# Patient Record
Sex: FEMALE | Race: WHITE | NOT HISPANIC OR LATINO | Employment: UNEMPLOYED | URBAN - METROPOLITAN AREA
[De-identification: names, ages, dates, MRNs, and addresses within clinical notes are randomized per-mention and may not be internally consistent; named-entity substitution may affect disease eponyms.]

---

## 2020-02-04 ENCOUNTER — OFFICE VISIT (OUTPATIENT)
Dept: FAMILY MEDICINE CLINIC | Facility: CLINIC | Age: 40
End: 2020-02-04

## 2020-02-04 VITALS
BODY MASS INDEX: 21.41 KG/M2 | HEART RATE: 69 BPM | SYSTOLIC BLOOD PRESSURE: 122 MMHG | HEIGHT: 65 IN | TEMPERATURE: 99 F | DIASTOLIC BLOOD PRESSURE: 76 MMHG | OXYGEN SATURATION: 99 % | RESPIRATION RATE: 12 BRPM | WEIGHT: 128.5 LBS

## 2020-02-04 DIAGNOSIS — R68.89 COLD INTOLERANCE: ICD-10-CM

## 2020-02-04 DIAGNOSIS — L65.9 HAIR LOSS: ICD-10-CM

## 2020-02-04 DIAGNOSIS — Z13.220 SCREENING FOR HYPERLIPIDEMIA: ICD-10-CM

## 2020-02-04 DIAGNOSIS — Z00.00 ANNUAL PHYSICAL EXAM: Primary | ICD-10-CM

## 2020-02-04 PROCEDURE — 99385 PREV VISIT NEW AGE 18-39: CPT | Performed by: FAMILY MEDICINE

## 2020-02-04 NOTE — PROGRESS NOTES
"Chief Complaint   Patient presents with   • Establish Care     NP to [facility]   • Annual Exam     not current on Pap but has obpeten       Dwayne ZURITA Darline is a 39 y.o. female.     History of Present Illness   She said her hair falls out a lot. Since the last baby who is 6 yo. Said normal for after baby but then didn't ever stop.   says her hair falls out quickly. Different lengths all over and breaking easy.     Annual exam  Gyn care she does have an appointment end of the month.  Diet and exercise  She is a runner. Also lifts weights and swims, stays active.  Eats overall pretty healthy clean whole foods, she likes sweets but tries to limit.   She does not do the flu shot.   Thinks She had TDaP with 7 yo, just turned 8.    The following portions of the patient's history were reviewed and updated as appropriate: allergies, current medications, past family history, past medical history, past social history, past surgical history and problem list.    Review of Systems   Constitutional: Negative for activity change, appetite change and unexpected weight change.   Respiratory: Negative for shortness of breath.    Cardiovascular: Negative for chest pain and leg swelling.   Gastrointestinal: Negative for blood in stool, constipation and diarrhea.       /76 (BP Location: Left arm, Patient Position: Sitting, Cuff Size: Small Adult)   Pulse 69   Temp 99 °F (37.2 °C) (Oral)   Resp 12   Ht 164 cm (64.57\")   Wt 58.3 kg (128 lb 8 oz)   SpO2 99%   BMI 21.67 kg/m²       Objective   Physical Exam   Constitutional: She is oriented to person, place, and time. She appears well-nourished. No distress.   HENT:   Right Ear: External ear normal.   Left Ear: External ear normal.   Nose: Nose normal.   Mouth/Throat: Oropharynx is clear and moist. No oropharyngeal exudate.   Bilateral ear canals and tympanic membranes appear normal.   Eyes: Conjunctivae and EOM are normal. Right eye exhibits no discharge. Left " eye exhibits no discharge. No scleral icterus.   Neck: Neck supple. No thyromegaly present.   Cardiovascular: Normal rate, regular rhythm, normal heart sounds and intact distal pulses. Exam reveals no gallop and no friction rub.   No murmur heard.  Pulmonary/Chest: Effort normal and breath sounds normal. No respiratory distress. She has no wheezes. She has no rales. She exhibits no tenderness.   Abdominal: Soft. Bowel sounds are normal. She exhibits no distension and no mass. There is no tenderness. There is no guarding.   Musculoskeletal: She exhibits no edema or deformity.   Lymphadenopathy:     She has no cervical adenopathy.   Neurological: She is alert and oriented to person, place, and time. She exhibits normal muscle tone. Coordination normal.   Skin: Skin is warm and dry.   No notable scalp showing through any areas. Diffuse thinning but minimal.    Psychiatric: She has a normal mood and affect. Her behavior is normal.   Vitals reviewed.      Assessment/Plan   Belle was seen today for establish care and annual exam.    Diagnoses and all orders for this visit:    Annual physical exam  -     Comprehensive Metabolic Panel    Cold intolerance  -     CBC & Differential  -     Comprehensive Metabolic Panel    Hair loss  -     Comprehensive Metabolic Panel  -     TSH  -     T4    Screening for hyperlipidemia  -     Lipid Panel    Preventive counseling  We discussed the importance of regular physical activity.  Cardiovascular activity for the equivalent of 30 minutes 5 days per week.  We also discussed the importance of healthy diet to avoid weight gain and sugar intolerance.  I recommend predominantly filling the diet with vegetables and lean meats followed by fruits and whole grains.  I also recommend overall avoiding processed foods.  She does great with exercise and pretty good on diet.   We discussed vaccines. If had TDaP with pregnancy she will likely be due soon. She will check with gyn next time she is  there.   Follows with gyn regularly.   We will check in on baseline labs today and labs considering her complaints of hair loss.

## 2020-02-05 LAB
ALBUMIN SERPL-MCNC: 4.4 G/DL (ref 3.5–5.2)
ALBUMIN/GLOB SERPL: 2 G/DL
ALP SERPL-CCNC: 58 U/L (ref 39–117)
ALT SERPL-CCNC: 12 U/L (ref 1–33)
AST SERPL-CCNC: 17 U/L (ref 1–32)
BASOPHILS # BLD AUTO: 0.02 10*3/MM3 (ref 0–0.2)
BASOPHILS NFR BLD AUTO: 0.3 % (ref 0–1.5)
BILIRUB SERPL-MCNC: 0.6 MG/DL (ref 0.2–1.2)
BUN SERPL-MCNC: 13 MG/DL (ref 6–20)
BUN/CREAT SERPL: 15.3 (ref 7–25)
CALCIUM SERPL-MCNC: 9.3 MG/DL (ref 8.6–10.5)
CHLORIDE SERPL-SCNC: 103 MMOL/L (ref 98–107)
CHOLEST SERPL-MCNC: 138 MG/DL (ref 0–200)
CO2 SERPL-SCNC: 23.6 MMOL/L (ref 22–29)
CREAT SERPL-MCNC: 0.85 MG/DL (ref 0.57–1)
EOSINOPHIL # BLD AUTO: 0.08 10*3/MM3 (ref 0–0.4)
EOSINOPHIL NFR BLD AUTO: 1.1 % (ref 0.3–6.2)
ERYTHROCYTE [DISTWIDTH] IN BLOOD BY AUTOMATED COUNT: 13.4 % (ref 12.3–15.4)
GLOBULIN SER CALC-MCNC: 2.2 GM/DL
GLUCOSE SERPL-MCNC: 85 MG/DL (ref 65–99)
HCT VFR BLD AUTO: 38.9 % (ref 34–46.6)
HDLC SERPL-MCNC: 75 MG/DL (ref 40–60)
HGB BLD-MCNC: 12.6 G/DL (ref 12–15.9)
IMM GRANULOCYTES # BLD AUTO: 0.02 10*3/MM3 (ref 0–0.05)
IMM GRANULOCYTES NFR BLD AUTO: 0.3 % (ref 0–0.5)
LDLC SERPL CALC-MCNC: 51 MG/DL (ref 0–100)
LYMPHOCYTES # BLD AUTO: 1.98 10*3/MM3 (ref 0.7–3.1)
LYMPHOCYTES NFR BLD AUTO: 27.5 % (ref 19.6–45.3)
MCH RBC QN AUTO: 27.9 PG (ref 26.6–33)
MCHC RBC AUTO-ENTMCNC: 32.4 G/DL (ref 31.5–35.7)
MCV RBC AUTO: 86.3 FL (ref 79–97)
MONOCYTES # BLD AUTO: 0.71 10*3/MM3 (ref 0.1–0.9)
MONOCYTES NFR BLD AUTO: 9.9 % (ref 5–12)
NEUTROPHILS # BLD AUTO: 4.39 10*3/MM3 (ref 1.7–7)
NEUTROPHILS NFR BLD AUTO: 60.9 % (ref 42.7–76)
NRBC BLD AUTO-RTO: 0 /100 WBC (ref 0–0.2)
PLATELET # BLD AUTO: 264 10*3/MM3 (ref 140–450)
POTASSIUM SERPL-SCNC: 4.2 MMOL/L (ref 3.5–5.2)
PROT SERPL-MCNC: 6.6 G/DL (ref 6–8.5)
RBC # BLD AUTO: 4.51 10*6/MM3 (ref 3.77–5.28)
SODIUM SERPL-SCNC: 138 MMOL/L (ref 136–145)
T4 SERPL-MCNC: 5.65 MCG/DL (ref 4.5–11.7)
TRIGL SERPL-MCNC: 58 MG/DL (ref 0–150)
TSH SERPL DL<=0.005 MIU/L-ACNC: 2.2 UIU/ML (ref 0.27–4.2)
VLDLC SERPL CALC-MCNC: 11.6 MG/DL
WBC # BLD AUTO: 7.2 10*3/MM3 (ref 3.4–10.8)

## 2020-02-11 ENCOUNTER — TELEPHONE (OUTPATIENT)
Dept: OBSTETRICS AND GYNECOLOGY | Age: 40
End: 2020-02-11

## 2020-02-11 NOTE — TELEPHONE ENCOUNTER
Please try to call pt again, I have left 2 messages. Pt was a Dr Ibrahim Pt and needs to be scheduled with the correct Dr

## 2020-03-05 ENCOUNTER — OFFICE VISIT (OUTPATIENT)
Dept: OBSTETRICS AND GYNECOLOGY | Age: 40
End: 2020-03-05

## 2020-03-05 VITALS
HEIGHT: 65 IN | DIASTOLIC BLOOD PRESSURE: 68 MMHG | BODY MASS INDEX: 21.06 KG/M2 | WEIGHT: 126.4 LBS | SYSTOLIC BLOOD PRESSURE: 118 MMHG

## 2020-03-05 DIAGNOSIS — Z11.51 ENCOUNTER FOR SCREENING FOR HUMAN PAPILLOMAVIRUS (HPV): ICD-10-CM

## 2020-03-05 DIAGNOSIS — Z01.419 WELL WOMAN EXAM WITH ROUTINE GYNECOLOGICAL EXAM: Primary | ICD-10-CM

## 2020-03-05 DIAGNOSIS — N92.0 MENORRHAGIA WITH REGULAR CYCLE: ICD-10-CM

## 2020-03-05 PROCEDURE — 99385 PREV VISIT NEW AGE 18-39: CPT | Performed by: OBSTETRICS & GYNECOLOGY

## 2020-03-05 NOTE — PROGRESS NOTES
"Chief complaint: annual/ re-establish care    Subjective   History of Present Illness    Belle Gregorio is a 39 y.o.  who presents for annual exam/ re-establish care. C/o menorrhagia. She desires intervention with Progesterone IUD. Contraception- vasectomy.  Her menses are regular every 28-30 days, lasting 6 days, soaks through tampons and pads. Interferes with daily activities. Does have terrible HAs that last 4 days, taking magnesium for them.  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         Current contraception: vasectomy  History of abnormal Pap smear: no  Received Gardasil immunization: no  Perform regular self breast exam: yes -    Family history of uterine or ovarian cancer: no  Family History of colon cancer: no  Family history of breast cancer: yes - maternal aunt dx early 50s    Mammogram: not indicated.  Colonoscopy: not indicated.  DEXA: not indicated.    Exercise: moderately active  Calcium/Vitamin D: adequate intake    The following portions of the patient's history were reviewed and updated as appropriate: allergies, current medications, past family history, past medical history, past social history, past surgical history and problem list.    Review of Systems   Constitutional: Negative.    Respiratory: Negative.    Cardiovascular: Negative.    Gastrointestinal: Negative.    Genitourinary:        Menorrhagia   Musculoskeletal: Negative.    Skin: Negative.    Neurological: Positive for headaches.   Psychiatric/Behavioral: Negative.        Pertinent items are noted in HPI.     Objective   Physical Exam    /68   Ht 165.1 cm (65\")   Wt 57.3 kg (126 lb 6.4 oz)   LMP " 02/10/2020 (Approximate)   Breastfeeding No   BMI 21.03 kg/m²     General:   alert, appears stated age and cooperative   Neck: no asymmetry, masses, or scars   Heart: regular rate and rhythm, S1, S2 normal, no murmur, click, rub or gallop   Lungs: clear to auscultation bilaterally   Abdomen: soft, non-tender, without masses or organomegaly   Breast: inspection negative, no nipple discharge or bleeding, no masses or nodularity palpable   Vulva: Bartholin's, Urethra, [place]'s normal   Vagina: normal mucosa   Cervix: multiparous appearance, no cervical motion tenderness and no lesions   Uterus: normal size, anteverted, mobile, non-tender, normal shape and consistency   Adnexa: normal adnexa and no mass, fullness, tenderness   Rectal: not indicated     Assessment/Plan   Belle was seen today for gynecologic exam.    Diagnoses and all orders for this visit:    Well woman exam with routine gynecological exam  -     PapIG, HPV, Rfx 16 / 18    Encounter for screening for human papillomavirus (HPV)  -     PapIG, HPV, Rfx 16 / 18    Menorrhagia with regular cycle    recommend Mirena IUD. Pt has used this in the past and agrees with plan. Scheduled US after placement. Info given for review.    Breast self exam technique reviewed and patient encouraged to perform self-exam monthly.  Discussed healthy lifestyle modifications.  Pap smear sent    Migraine HA without aura- declines neuro referral at this time. Will consider in the future. Would recommend avoiding combination ocps.

## 2020-03-09 LAB
CYTOLOGIST CVX/VAG CYTO: NORMAL
CYTOLOGY CVX/VAG DOC CYTO: NORMAL
CYTOLOGY CVX/VAG DOC THIN PREP: NORMAL
DX ICD CODE: NORMAL
HIV 1 & 2 AB SER-IMP: NORMAL
HPV I/H RISK 1 DNA CVX QL PROBE+SIG AMP: NEGATIVE
OTHER STN SPEC: NORMAL
STAT OF ADQ CVX/VAG CYTO-IMP: NORMAL

## 2020-03-10 ENCOUNTER — TELEPHONE (OUTPATIENT)
Dept: OBSTETRICS AND GYNECOLOGY | Age: 40
End: 2020-03-10

## 2020-03-17 ENCOUNTER — OFFICE VISIT (OUTPATIENT)
Dept: OBSTETRICS AND GYNECOLOGY | Age: 40
End: 2020-03-17

## 2020-03-17 ENCOUNTER — PROCEDURE VISIT (OUTPATIENT)
Dept: OBSTETRICS AND GYNECOLOGY | Age: 40
End: 2020-03-17

## 2020-03-17 VITALS
DIASTOLIC BLOOD PRESSURE: 68 MMHG | HEIGHT: 65 IN | WEIGHT: 126 LBS | SYSTOLIC BLOOD PRESSURE: 112 MMHG | BODY MASS INDEX: 20.99 KG/M2

## 2020-03-17 DIAGNOSIS — N92.0 MENORRHAGIA WITH REGULAR CYCLE: ICD-10-CM

## 2020-03-17 DIAGNOSIS — Z30.431 IUD CHECK UP: Primary | ICD-10-CM

## 2020-03-17 DIAGNOSIS — Z30.430 ENCOUNTER FOR INSERTION OF MIRENA IUD: Primary | ICD-10-CM

## 2020-03-17 LAB
B-HCG UR QL: NEGATIVE
INTERNAL NEGATIVE CONTROL: NEGATIVE
INTERNAL POSITIVE CONTROL: POSITIVE
Lab: NORMAL

## 2020-03-17 PROCEDURE — 58300 INSERT INTRAUTERINE DEVICE: CPT | Performed by: PHYSICIAN ASSISTANT

## 2020-03-17 PROCEDURE — 81025 URINE PREGNANCY TEST: CPT | Performed by: PHYSICIAN ASSISTANT

## 2020-03-17 PROCEDURE — 76830 TRANSVAGINAL US NON-OB: CPT | Performed by: OBSTETRICS & GYNECOLOGY

## 2020-03-17 NOTE — PROGRESS NOTES
IUD Insertion    Patient's last menstrual period was 03/06/2020 (exact date).    Date of procedure:  3/17/2020    Risks and benefits discussed? yes  All questions answered? yes  Consents given by The patient  Written consent obtained? yes    Procedure documentation:     Urine pregnancy test was done today and result was negative.  The risks (including infection,  bleeding, pain, and uterine perforation) and benefits of the procedure were  explained to the patient and Written informed consent was  obtained.    After verifying the patient had a low probability of being pregnant and met the criteria for insertion, a sterile speculum has placed and the cervix was cleansed with an antiseptic solution.  Vaginal discharge was scant.  The anterior lip of the cervix was grasped with an allis and the uterine cavity was gently sounded. There was mild difficulty passing the sound through the cervix.  Cervical dilation did not need to be performed prior to placing the IUD.  The uterus was anteverted and sounded to 7 cms.  The Mirena was then prepared per the manufacturers instructions.    The Mirena was advanced to a point 2 cms from the fundus and then the arms were released from the sheath.  The device was advanced to the fundus and the device was released fully from the sheath.. The string was cut 2 cms in length.  Bleeding from the cervix was scant.    She tolerated the procedure without any difficulty.    Post procedure instructions: The patient was advised to call for any fever or for  prolonged or severe pain or bleeding. Pelvic rest  advised for 2 wks    Follow up needed: 6 weeks for IUD check      U/s done and IUD is in endometrial cavity, slightly low.  Pt using this for cycle control. Will plan to recheck u/s in 6 wks and pt will call for pain or excess bleeding.

## 2020-04-27 ENCOUNTER — TELEPHONE (OUTPATIENT)
Dept: OBSTETRICS AND GYNECOLOGY | Age: 40
End: 2020-04-27

## 2020-04-27 NOTE — TELEPHONE ENCOUNTER
(Patrice pt) Mirena IUD put in six weeks ago.  Four weeks she has been bleeding or spotting.      Is this normal?      If so, when will the bleeding end?      [phone]

## 2020-04-27 NOTE — TELEPHONE ENCOUNTER
Called patient to discuss her symptoms- she is not having any heavy bleeding or pain. Reina and Dr. Ibrahim did want her to repeat US in 6 weeks as the IUD was somewhat low in the uterine cavity. I encouraged her to go ahead and follow through with this to be safe. She is happy with this plan.    Maranda- please schedule US and f/u with Reina this week (IUD check). Thank you!

## 2020-04-30 ENCOUNTER — PROCEDURE VISIT (OUTPATIENT)
Dept: OBSTETRICS AND GYNECOLOGY | Age: 40
End: 2020-04-30

## 2020-04-30 ENCOUNTER — OFFICE VISIT (OUTPATIENT)
Dept: OBSTETRICS AND GYNECOLOGY | Age: 40
End: 2020-04-30

## 2020-04-30 VITALS
HEIGHT: 65 IN | DIASTOLIC BLOOD PRESSURE: 68 MMHG | BODY MASS INDEX: 21.16 KG/M2 | SYSTOLIC BLOOD PRESSURE: 118 MMHG | WEIGHT: 127 LBS

## 2020-04-30 DIAGNOSIS — Z30.431 IUD CHECK UP: ICD-10-CM

## 2020-04-30 DIAGNOSIS — Z30.431 IUD CHECK UP: Primary | ICD-10-CM

## 2020-04-30 DIAGNOSIS — N92.6 IRREGULAR MENSES: Primary | ICD-10-CM

## 2020-04-30 DIAGNOSIS — G44.89 OTHER HEADACHE SYNDROME: ICD-10-CM

## 2020-04-30 PROCEDURE — 99213 OFFICE O/P EST LOW 20 MIN: CPT | Performed by: PHYSICIAN ASSISTANT

## 2020-04-30 PROCEDURE — 76830 TRANSVAGINAL US NON-OB: CPT | Performed by: OBSTETRICS & GYNECOLOGY

## 2020-04-30 RX ORDER — SUMATRIPTAN 25 MG/1
TABLET, FILM COATED ORAL
Qty: 30 TABLET | Refills: 1 | Status: SHIPPED | OUTPATIENT
Start: 2020-04-30 | End: 2022-03-15

## 2020-04-30 NOTE — PROGRESS NOTES
"Subjective     Chief Complaint   Patient presents with   • Gynecologic Exam     c/o irregular bleeding with iud       Belle Gregorio is a 39 y.o.  whose LMP is No LMP recorded. (Menstrual status: Other). presents with spotting with her IUD  She had this placed by me on the   At the time, the u/s revealed the IUD to be slightly low  We opted to keep it in place and rpt u/s which she is doing today  She is using this for cycle control   had vasectomy    Also notes migraines with bleeding  Takes ibuprofen which takes the edge off  No vision changes or auras  H/o this just increased since she is bleeding more    No other c/o  Doing ok considering the pandemic    Pt of Dr Ibrahim  No Additional Complaints Reported    The following portions of the patient's history were reviewed and updated as appropriate:vital signs, allergies, current medications, past family history, past medical history, past social history, past surgical history and problem list      Review of Systems   Genitourinary:positive for abnormal menstrual periods  Neurological: positive for headaches     Objective      /68   Ht 165.1 cm (65\")   Wt 57.6 kg (127 lb)   Breastfeeding No   BMI 21.13 kg/m²     Physical Exam    General:   alert, comfortable and no distress   Heart: Not performed today   Lungs: Not performed today.   Breast: Not performed today   Neck: na   Abdomen: {Not performed today   CVA: Not performed today   Pelvis: Not performed today   Extremities: Not performed today   Neurologic: negative   Psychiatric: Normal affect, judgement, and mood       Lab Review   Labs: No data reviewed     Imaging   Ultrasound - Pelvic Vaginal  IUD within endometrial cavity. Endo thickness measures 6.33 mm.    Assessment/Plan     ASSESSMENT  1. Irregular menses    2. IUD check up    3. Other headache syndrome        PLAN  1. U/s is reassuring. Pt feels more at ease. Is dealing with increased HA/migraines with bleeding. Will try " imitrex prn. Also encouraged increased hydration and decreased caffeine and alcohol intake    2. Medications prescribed this encounter:        New Medications Ordered This Visit   Medications   • SUMAtriptan (Imitrex) 25 MG tablet     Sig: Take one tablet at onset of headache. May repeat dose one time in 2 hours if headache not relieved.     Dispense:  30 tablet     Refill:  1           Follow up: 10 month(s) for annual exam    DENNIS Echols  4/30/2020

## 2021-02-02 DIAGNOSIS — Z00.00 ANNUAL PHYSICAL EXAM: Primary | ICD-10-CM

## 2021-02-03 DIAGNOSIS — Z00.00 ANNUAL PHYSICAL EXAM: ICD-10-CM

## 2021-03-10 ENCOUNTER — OFFICE VISIT (OUTPATIENT)
Dept: OBSTETRICS AND GYNECOLOGY | Age: 41
End: 2021-03-10

## 2021-03-10 VITALS
SYSTOLIC BLOOD PRESSURE: 108 MMHG | BODY MASS INDEX: 21.09 KG/M2 | HEIGHT: 65 IN | WEIGHT: 126.6 LBS | DIASTOLIC BLOOD PRESSURE: 58 MMHG

## 2021-03-10 DIAGNOSIS — Z01.419 WELL WOMAN EXAM WITH ROUTINE GYNECOLOGICAL EXAM: Primary | ICD-10-CM

## 2021-03-10 PROCEDURE — 99395 PREV VISIT EST AGE 18-39: CPT | Performed by: OBSTETRICS & GYNECOLOGY

## 2021-03-10 NOTE — PROGRESS NOTES
"Chief complaint: annual    Subjective   History of Present Illness    Belle Gregorio is a 40 y.o.  who presents for annual exam. No gyn c/o. Mirena placed 3/2020 for menorrhagia.   Her menses are very light w/ the Mirena. Menses q mo, has spotting only  3/5/2020 pap normal, HPV-  Soc hx- , 4 kids doing well    Obstetric History:  OB History        5    Para   4    Term   4            AB   1    Living   4       SAB   1    TAB        Ectopic        Molar        Multiple        Live Births   4               Menstrual History:     No LMP recorded. Patient has had an implant.         Current contraception: vasectomy  History of abnormal Pap smear: no  Received Gardasil immunization: no  Perform regular self breast exam: yes -    Family history of uterine or ovarian cancer: no  Family History of colon cancer: no  Family history of breast cancer: yes - maternal aunt dx 50s    Mammogram: ordered for next year, age 41  Colonoscopy: not indicated.  DEXA: not indicated.    Exercise: moderately active  Calcium/Vitamin D: adequate intake    The following portions of the patient's history were reviewed and updated as appropriate: allergies, current medications, past family history, past medical history, past social history, past surgical history and problem list.    Review of Systems   Constitutional: Negative.    Genitourinary: Negative.        Pertinent items are noted in HPI.     Objective   Physical Exam    /58   Ht 165.1 cm (65\")   Wt 57.4 kg (126 lb 9.6 oz)   Breastfeeding No   BMI 21.07 kg/m²     General:   alert, appears stated age and cooperative   Neck: no asymmetry, masses, or scars   Heart: regular rate and rhythm, S1, S2 normal, no murmur, click, rub or gallop   Lungs: clear to auscultation bilaterally   Abdomen: soft, non-tender, without masses or organomegaly   Breast: inspection negative, no nipple discharge or bleeding, no masses or nodularity palpable   Vulva: normal, " Bartholin's, Urethra, [place]'s normal   Vagina: normal mucosa   Cervix: multiparous appearance, no cervical motion tenderness, no lesions and IUD string noted just outside os   Uterus: normal size, anteverted, mobile, non-tender, normal shape and consistency   Adnexa: normal adnexa and no mass, fullness, tenderness   Rectal: not indicated     Assessment/Plan   Diagnoses and all orders for this visit:    1. Well woman exam with routine gynecological exam (Primary)    doing well w/ IUD, menorrhagia resolved  Start MMGs age 41    Breast self exam technique reviewed and patient encouraged to perform self-exam monthly.  Discussed healthy lifestyle modifications.  Pap smear up to date

## 2021-04-16 ENCOUNTER — BULK ORDERING (OUTPATIENT)
Dept: CASE MANAGEMENT | Facility: OTHER | Age: 41
End: 2021-04-16

## 2021-04-16 DIAGNOSIS — Z23 IMMUNIZATION DUE: ICD-10-CM

## 2021-11-08 ENCOUNTER — TELEPHONE (OUTPATIENT)
Dept: FAMILY MEDICINE CLINIC | Facility: CLINIC | Age: 41
End: 2021-11-08

## 2021-11-08 NOTE — TELEPHONE ENCOUNTER
We do not place labs into the system that far into advance. Will set reminder and have the done prior to appt

## 2021-11-08 NOTE — TELEPHONE ENCOUNTER
Caller: Belle Gregorio    Relationship: Self    Best call back number: [phone]    What orders are you requesting (i.e. lab or imaging): FASTING LABS    In what timeframe would the patient need to come in: WEEK BEFORE APPOINTMENT ON 5/3/22    Where will you receive your lab/imaging services: IN OFFICE, [facility]    Additional notes: PLEASE CALL PATIENT TO SCHEDULE

## 2022-03-15 ENCOUNTER — OFFICE VISIT (OUTPATIENT)
Dept: FAMILY MEDICINE CLINIC | Facility: CLINIC | Age: 42
End: 2022-03-15

## 2022-03-15 VITALS
RESPIRATION RATE: 16 BRPM | HEIGHT: 65 IN | HEART RATE: 77 BPM | OXYGEN SATURATION: 98 % | SYSTOLIC BLOOD PRESSURE: 110 MMHG | WEIGHT: 122.5 LBS | DIASTOLIC BLOOD PRESSURE: 70 MMHG | TEMPERATURE: 99.4 F | BODY MASS INDEX: 20.41 KG/M2

## 2022-03-15 DIAGNOSIS — E78.89 ELEVATED HDL: ICD-10-CM

## 2022-03-15 DIAGNOSIS — Z11.59 ENCOUNTER FOR HEPATITIS C SCREENING TEST FOR LOW RISK PATIENT: ICD-10-CM

## 2022-03-15 DIAGNOSIS — R00.2 PALPITATIONS: ICD-10-CM

## 2022-03-15 DIAGNOSIS — Z00.00 ANNUAL PHYSICAL EXAM: Primary | ICD-10-CM

## 2022-03-15 PROCEDURE — 93000 ELECTROCARDIOGRAM COMPLETE: CPT | Performed by: FAMILY MEDICINE

## 2022-03-15 PROCEDURE — 99396 PREV VISIT EST AGE 40-64: CPT | Performed by: FAMILY MEDICINE

## 2022-03-15 NOTE — PROGRESS NOTES
Procedure     ECG 12 Lead    Date/Time: 3/15/2022 1:13 PM  Performed by: Laurie Sanford MD  Authorized by: Laurie Sanford MD   Comparison: not compared with previous ECG   Previous ECG: no previous ECG available  Rhythm: sinus rhythm and sinus arrhythmia  Rate: normal  BPM: 63  Conduction: conduction normal  ST Segments: ST segments normal  T Waves: T waves normal  QRS axis: normal    Clinical impression: normal ECG

## 2022-03-15 NOTE — PROGRESS NOTES
"Chief Complaint  Annual Exam    Subjective          Belle Gregorio presents to [facility]  History of Present Illness  Annual exam  Diet and exercise  She is exercising 6-7 days. She swims 3 days, lifts 3 days, runs and walks another day.   Smart eating, no special diet.   Tries to avoid medicine. Avoids alcohol for her migraines. mirena has helped.   She has Mirena and is going to see Dr. Keller.   She is not interested with the booster. Started having periods again when     For about 10 years 3-4 times per year.   Feels fine. Says sometimes goes on for a couple of days or couple of weeks where she just feels it more.   She was having an episode and was on the rollercoaster and after was better.   Not just pronounced but rapid too. Feels like she is running but not out of breath.   First time she noticed it she was pregnant. About 10 years ago.   She said she had miscarriage. Then not with the next pregnancy.   No triggers identified.     Objective   Vital Signs:   /70 (BP Location: Right arm, Patient Position: Sitting, Cuff Size: Adult)   Pulse 77   Temp 99.4 °F (37.4 °C) (Temporal)   Resp 16   Ht 165.1 cm (65\")   Wt 55.6 kg (122 lb 8 oz)   SpO2 98%   BMI 20.39 kg/m²     Physical Exam  Vitals reviewed.   Constitutional:       General: She is not in acute distress.     Appearance: Normal appearance. She is normal weight. She is not ill-appearing or toxic-appearing.   HENT:      Right Ear: Tympanic membrane, ear canal and external ear normal. There is no impacted cerumen.      Left Ear: Tympanic membrane, ear canal and external ear normal. There is no impacted cerumen.      Nose: Nose normal.      Mouth/Throat:      Mouth: Mucous membranes are moist.      Pharynx: Oropharynx is clear. No oropharyngeal exudate or posterior oropharyngeal erythema.   Eyes:      General: No scleral icterus.        Right eye: No discharge.         Left eye: No discharge.      Conjunctiva/sclera: " Conjunctivae normal.   Neck:      Thyroid: No thyromegaly.      Vascular: No carotid bruit.   Cardiovascular:      Rate and Rhythm: Normal rate and regular rhythm.      Heart sounds: Normal heart sounds. No murmur heard.    No friction rub. No gallop.   Pulmonary:      Effort: Pulmonary effort is normal. No respiratory distress.      Breath sounds: Normal breath sounds. No wheezing or rales.   Chest:      Chest wall: No tenderness.   Abdominal:      General: Bowel sounds are normal. There is no distension.      Palpations: Abdomen is soft. There is no mass.      Tenderness: There is no abdominal tenderness. There is no guarding.   Musculoskeletal:         General: No deformity.      Cervical back: Neck supple.      Right lower leg: No edema.      Left lower leg: No edema.   Lymphadenopathy:      Cervical: No cervical adenopathy.   Skin:     Coloration: Skin is not jaundiced or pale.   Neurological:      Mental Status: She is alert and oriented to person, place, and time.      Motor: No abnormal muscle tone.      Coordination: Coordination normal.   Psychiatric:         Mood and Affect: Mood normal.         Behavior: Behavior normal.        Result Review :                 Assessment and Plan    Diagnoses and all orders for this visit:    1. Annual physical exam (Primary)  -     Comprehensive Metabolic Panel  -     CBC & Differential  -     Lipid Panel    2. Elevated HDL  -     Comprehensive Metabolic Panel  -     CBC & Differential  -     Lipid Panel    3. Encounter for hepatitis C screening test for low risk patient  -     Hepatitis C Antibody    4. Palpitations  -     ECG 12 Lead        Follow Up   No follow-ups on file.  Patient was given instructions and counseling regarding her condition or for health maintenance advice. Please see specific information pulled into the AVS if appropriate.     Preventive counseling  Great on her exercise. She has healthy diet. Normal weight.   She follows with Gyn will see new  provider (already knows related to provider delivering one of her babies) but new for regular visits next month.   She will get her first mammogram.   She has a 8 yo at home so likely she is utd on her TDaP. She has 4 children.   I suggested getting TDaP again when she is going to be a grandmother or she has a dirty wound.   Not going to get the booster for COVID as of now.     We discussed her palpitations. She has been telling people about her experience and has been told it is not normal.   Given it happen infrequently and has been happening over about the last 10 years without other concerning symptoms like CP, SOB, lightheadedness it is unlikely to pathological. We talked about benign PVCs, triggers. We did do EKG to look for any underlying abn that would make her more likely to have palpitations.   Discussed concerning symptoms for which to go to ER.   Other symptoms for which to call me and we can do Echo and get her to cardiology. She has longstanding symptoms and swims, runs, rides, lifts weights. Good CV fitness.

## 2022-03-16 LAB
ALBUMIN SERPL-MCNC: 4.5 G/DL (ref 3.8–4.8)
ALBUMIN/GLOB SERPL: 2 {RATIO} (ref 1.2–2.2)
ALP SERPL-CCNC: 58 IU/L (ref 44–121)
ALT SERPL-CCNC: 16 IU/L (ref 0–32)
AST SERPL-CCNC: 19 IU/L (ref 0–40)
BASOPHILS # BLD AUTO: 0 X10E3/UL (ref 0–0.2)
BASOPHILS NFR BLD AUTO: 0 %
BILIRUB SERPL-MCNC: 0.5 MG/DL (ref 0–1.2)
BUN SERPL-MCNC: 13 MG/DL (ref 6–24)
BUN/CREAT SERPL: 16 (ref 9–23)
CALCIUM SERPL-MCNC: 9.9 MG/DL (ref 8.7–10.2)
CHLORIDE SERPL-SCNC: 103 MMOL/L (ref 96–106)
CHOLEST SERPL-MCNC: 157 MG/DL (ref 100–199)
CO2 SERPL-SCNC: 23 MMOL/L (ref 20–29)
CREAT SERPL-MCNC: 0.83 MG/DL (ref 0.57–1)
EGFRCR SERPLBLD CKD-EPI 2021: 91 ML/MIN/1.73
EOSINOPHIL # BLD AUTO: 0.1 X10E3/UL (ref 0–0.4)
EOSINOPHIL NFR BLD AUTO: 1 %
ERYTHROCYTE [DISTWIDTH] IN BLOOD BY AUTOMATED COUNT: 12.2 % (ref 11.7–15.4)
GLOBULIN SER CALC-MCNC: 2.2 G/DL (ref 1.5–4.5)
GLUCOSE SERPL-MCNC: 82 MG/DL (ref 65–99)
HCT VFR BLD AUTO: 45.7 % (ref 34–46.6)
HCV AB S/CO SERPL IA: 0.1 S/CO RATIO (ref 0–0.9)
HDLC SERPL-MCNC: 70 MG/DL
HGB BLD-MCNC: 15 G/DL (ref 11.1–15.9)
IMM GRANULOCYTES # BLD AUTO: 0 X10E3/UL (ref 0–0.1)
IMM GRANULOCYTES NFR BLD AUTO: 0 %
LDLC SERPL CALC-MCNC: 74 MG/DL (ref 0–99)
LYMPHOCYTES # BLD AUTO: 2.2 X10E3/UL (ref 0.7–3.1)
LYMPHOCYTES NFR BLD AUTO: 23 %
MCH RBC QN AUTO: 30.2 PG (ref 26.6–33)
MCHC RBC AUTO-ENTMCNC: 32.8 G/DL (ref 31.5–35.7)
MCV RBC AUTO: 92 FL (ref 79–97)
MONOCYTES # BLD AUTO: 0.7 X10E3/UL (ref 0.1–0.9)
MONOCYTES NFR BLD AUTO: 7 %
NEUTROPHILS # BLD AUTO: 6.6 X10E3/UL (ref 1.4–7)
NEUTROPHILS NFR BLD AUTO: 69 %
PLATELET # BLD AUTO: 289 X10E3/UL (ref 150–450)
POTASSIUM SERPL-SCNC: 4.7 MMOL/L (ref 3.5–5.2)
PROT SERPL-MCNC: 6.7 G/DL (ref 6–8.5)
RBC # BLD AUTO: 4.97 X10E6/UL (ref 3.77–5.28)
SODIUM SERPL-SCNC: 140 MMOL/L (ref 134–144)
TRIGL SERPL-MCNC: 66 MG/DL (ref 0–149)
VLDLC SERPL CALC-MCNC: 13 MG/DL (ref 5–40)
WBC # BLD AUTO: 9.5 X10E3/UL (ref 3.4–10.8)

## 2022-04-15 ENCOUNTER — OFFICE VISIT (OUTPATIENT)
Dept: OBSTETRICS AND GYNECOLOGY | Age: 42
End: 2022-04-15

## 2022-04-15 ENCOUNTER — PROCEDURE VISIT (OUTPATIENT)
Dept: OBSTETRICS AND GYNECOLOGY | Age: 42
End: 2022-04-15

## 2022-04-15 ENCOUNTER — APPOINTMENT (OUTPATIENT)
Dept: WOMENS IMAGING | Facility: HOSPITAL | Age: 42
End: 2022-04-15

## 2022-04-15 VITALS
BODY MASS INDEX: 20.49 KG/M2 | DIASTOLIC BLOOD PRESSURE: 74 MMHG | WEIGHT: 123 LBS | SYSTOLIC BLOOD PRESSURE: 108 MMHG | HEIGHT: 65 IN

## 2022-04-15 DIAGNOSIS — Z11.51 SPECIAL SCREENING EXAMINATION FOR HUMAN PAPILLOMAVIRUS (HPV): ICD-10-CM

## 2022-04-15 DIAGNOSIS — Z12.4 SCREENING FOR MALIGNANT NEOPLASM OF THE CERVIX: Primary | ICD-10-CM

## 2022-04-15 DIAGNOSIS — Z12.31 VISIT FOR SCREENING MAMMOGRAM: Primary | ICD-10-CM

## 2022-04-15 DIAGNOSIS — Z01.419 ENCOUNTER FOR GYNECOLOGICAL EXAMINATION WITHOUT ABNORMAL FINDING: ICD-10-CM

## 2022-04-15 PROCEDURE — 99396 PREV VISIT EST AGE 40-64: CPT | Performed by: OBSTETRICS & GYNECOLOGY

## 2022-04-15 PROCEDURE — 77063 BREAST TOMOSYNTHESIS BI: CPT | Performed by: RADIOLOGY

## 2022-04-15 PROCEDURE — 77063 BREAST TOMOSYNTHESIS BI: CPT | Performed by: OBSTETRICS & GYNECOLOGY

## 2022-04-15 PROCEDURE — 77067 SCR MAMMO BI INCL CAD: CPT | Performed by: OBSTETRICS & GYNECOLOGY

## 2022-04-15 PROCEDURE — 77067 SCR MAMMO BI INCL CAD: CPT | Performed by: RADIOLOGY

## 2022-04-15 RX ORDER — CHLORAL HYDRATE 500 MG
CAPSULE ORAL
COMMUNITY

## 2022-04-15 RX ORDER — MULTIPLE VITAMINS W/ MINERALS TAB 9MG-400MCG
1 TAB ORAL DAILY
COMMUNITY

## 2022-04-15 NOTE — PROGRESS NOTES
Subjective     Chief Complaint   Patient presents with   • Gynecologic Exam     AC was Dr Ibrahim patient.  MG done today       History of Present Illness    Belle Gregorio is a 41 y.o.  who presents for annual exam.  Patient is a very nice former patient of Dr. Ibrahim.  I delivered her son on [place], 9 years ago.  Patient is doing well.  She is happy with her IUD.  She is very active with exercise.  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  Patient notes that her migraines have been better since she has had her Mirena.  Her menses are regular every 28-30 days, lasting 0-3 days, dysmenorrhea none   Obstetric History:  OB History        5    Para   4    Term   4            AB   1    Living   4       SAB   1    IAB        Ectopic        Molar        Multiple        Live Births   4               Menstrual History:     No LMP recorded. Patient has had an implant.         Current contraception: IUD mirena 3/2020  History of abnormal Pap smear: no  Received Gardasil immunization: no  Perform regular self breast exam : no  Family history of uterine or ovarian cancer: no  Family History of colon cancer: no  Family history of breast cancer: maternal aunt 55    Mammogram: done today.  Colonoscopy: not indicated.  DEXA: not indicated.    Exercise: swims, runs and weights   Calcium/Vitamin D: adequate intake and uses supplements    The following portions of the patient's history were reviewed and updated as appropriate: allergies, current medications, past family history, past medical history, past social history, past surgical history and problem list.    Review of Systems    Review of Systems   Constitutional: Negative for fatigue.   Respiratory: Negative for shortness of breath.    Gastrointestinal: Negative for abdominal pain.   Genitourinary: Negative for dysuria.   Neurological: Negative for headaches.   Psychiatric/Behavioral: Negative for dysphoric mood.  "    Objective   Physical Exam    /74   Ht 165.1 cm (65\")   Wt 55.8 kg (123 lb)   Breastfeeding No   BMI 20.47 kg/m²     General:   alert, appears stated age and cooperative   Neck: thyroid normal to palpation   Heart: regular rate and rhythm   Lungs: clear to auscultation bilaterally   Abdomen: soft, non-tender, without masses or organomegaly   Breast: inspection negative, no nipple discharge or bleeding, no masses or nodularity palpable   Vulva: normal, Bartholin's, Urethra, Danube's normal   Vagina: normal mucosa, normal discharge   Cervix: no cervical motion tenderness and no lesions   Uterus: non-tender, normal shape and consistency   Adnexa: no mass, fullness, tenderness   Rectal: normal rectal, no masses     Assessment/Plan   Diagnoses and all orders for this visit:    1. Screening for malignant neoplasm of the cervix (Primary)  -     IGP, Aptima HPV, Rfx 16 / 18,45    2. Special screening examination for human papillomavirus (HPV)  -     IGP, Aptima HPV, Rfx 16 / 18,45    3. Encounter for gynecological examination without abnormal finding    Patient is doing well.  She has excellent exercise habits.  She is thin.  I encouraged her to make sure she gets good calcium supplementation through diet.  Pap smear was sent off today.    All questions answered.  Breast self exam technique reviewed and patient encouraged to perform self-exam monthly.  Discussed healthy lifestyle modifications.  Recommended 30 minutes of aerobic exercise five times per week.  Discussed calcium needs to prevent osteoporosis.                     "

## 2022-04-22 LAB
CYTOLOGIST CVX/VAG CYTO: ABNORMAL
CYTOLOGY CVX/VAG DOC CYTO: ABNORMAL
CYTOLOGY CVX/VAG DOC THIN PREP: ABNORMAL
DX ICD CODE: ABNORMAL
DX ICD CODE: ABNORMAL
HIV 1 & 2 AB SER-IMP: ABNORMAL
HPV I/H RISK 4 DNA CVX QL PROBE+SIG AMP: NEGATIVE
OTHER STN SPEC: ABNORMAL
PATHOLOGIST CVX/VAG CYTO: ABNORMAL
RECOM F/U CVX/VAG CYTO: ABNORMAL
STAT OF ADQ CVX/VAG CYTO-IMP: ABNORMAL

## 2022-04-25 PROBLEM — R87.611 PAP SMEAR OF CERVIX WITH ASCUS, CANNOT EXCLUDE HGSIL: Status: ACTIVE | Noted: 2022-04-25

## 2022-05-13 ENCOUNTER — PROCEDURE VISIT (OUTPATIENT)
Dept: OBSTETRICS AND GYNECOLOGY | Age: 42
End: 2022-05-13

## 2022-05-13 VITALS
DIASTOLIC BLOOD PRESSURE: 74 MMHG | HEIGHT: 65 IN | BODY MASS INDEX: 20.49 KG/M2 | WEIGHT: 123 LBS | SYSTOLIC BLOOD PRESSURE: 112 MMHG

## 2022-05-13 DIAGNOSIS — Z32.00 ENCOUNTER FOR PREGNANCY TEST, RESULT UNKNOWN: Primary | ICD-10-CM

## 2022-05-13 DIAGNOSIS — R87.611 PAP SMEAR OF CERVIX WITH ASCUS, CANNOT EXCLUDE HGSIL: ICD-10-CM

## 2022-05-13 LAB
B-HCG UR QL: NEGATIVE
EXPIRATION DATE: NORMAL
INTERNAL NEGATIVE CONTROL: NEGATIVE
INTERNAL POSITIVE CONTROL: POSITIVE
Lab: NORMAL

## 2022-05-13 PROCEDURE — 57454 BX/CURETT OF CERVIX W/SCOPE: CPT | Performed by: OBSTETRICS & GYNECOLOGY

## 2022-05-13 PROCEDURE — 81025 URINE PREGNANCY TEST: CPT | Performed by: OBSTETRICS & GYNECOLOGY

## 2022-05-13 NOTE — PROGRESS NOTES
Colposcopy    Date of procedure:  5/13/2022   Risks and benefits discussed? yes   All questions answered? yes   Consents given by: patient       Pre-op indication: ASC - H  Neg HPV          Procedure documentation:  The cervix was initially viewed colposcopically through a green filter.  The cervix was next bathed in acetic acid.   The findings were as follows:    Transformation zone seen? Yes   Findings: 1. No mosaicism  2. No punctation  3. No abnormal vasculature  4. Acetowhite noted at 5 o'clock and 6 o'clock   Ectocervical biopsies: taken from 5 o'clock and 6 o'clock.  Monsels solution was applied to the biopsy sites.   Endocervical curettage: performed       Physical Exam  Genitourinary:                   Colposcopic Impression: 1. Mild dysplasia  2. Adequate colposcopy  3. Colposcopic findings are consistent with cytology    4.   The patient tolerated the procedure well      Plan: Will base further treatment on pathology results  Post biopsy instructions given to patient.  Specimens labelled and sent to pathology.

## 2022-05-17 LAB
DX ICD CODE: NORMAL
DX ICD CODE: NORMAL
PATH REPORT.FINAL DX SPEC: NORMAL
PATH REPORT.GROSS SPEC: NORMAL
PATH REPORT.SITE OF ORIGIN SPEC: NORMAL
PATHOLOGIST NAME: NORMAL
PAYMENT PROCEDURE: NORMAL

## 2022-06-28 ENCOUNTER — OFFICE VISIT (OUTPATIENT)
Dept: FAMILY MEDICINE CLINIC | Facility: CLINIC | Age: 42
End: 2022-06-28

## 2022-06-28 VITALS
BODY MASS INDEX: 21.04 KG/M2 | HEIGHT: 65 IN | SYSTOLIC BLOOD PRESSURE: 128 MMHG | WEIGHT: 126.3 LBS | HEART RATE: 71 BPM | DIASTOLIC BLOOD PRESSURE: 70 MMHG | TEMPERATURE: 96.6 F | OXYGEN SATURATION: 99 % | RESPIRATION RATE: 18 BRPM

## 2022-06-28 DIAGNOSIS — I83.93 VARICOSE VEINS OF BOTH LOWER EXTREMITIES, UNSPECIFIED WHETHER COMPLICATED: ICD-10-CM

## 2022-06-28 DIAGNOSIS — K64.4 EXTERNAL HEMORRHOIDS WITHOUT COMPLICATION: Primary | ICD-10-CM

## 2022-06-28 PROCEDURE — 99213 OFFICE O/P EST LOW 20 MIN: CPT | Performed by: FAMILY MEDICINE

## 2022-06-28 NOTE — PROGRESS NOTES
"Chief Complaint  Hemorrhoids (X 1 month)    Subjective        Belle Gregorio presents to [facility]  History of Present Illness  Says started after her exam with gyn on 4/15/22.   She said itching and did not know what was going on.   Thought she was not clean. Sleeping without underwear because itching around the area.   Then cleaned and might have made it worse  Then she got worse  Looked in the mirror and saw the hemorrhoid.   She says easy with BMs. Regularly two times per morning.   Said hemorrhoids before after kids but never an ongoing problem.   Using prep H and helps but won't go away. Using 3-4 times per day.   Varicose vein in the groin on the right and on the legs renetta back of knees.   She notes this year that she is having a little bit more irritation with the groin vein.  She said about 5 years ago she went to a vein specialist and had them evaluated.  No procedures were recommended at that time.    Objective   Vital Signs:  /70 (BP Location: Right arm, Patient Position: Sitting, Cuff Size: Adult)   Pulse 71   Temp 96.6 °F (35.9 °C) (Temporal)   Resp 18   Ht 165.1 cm (65\")   Wt 57.3 kg (126 lb 4.8 oz)   SpO2 99%   BMI 21.02 kg/m²   Estimated body mass index is 21.02 kg/m² as calculated from the following:    Height as of this encounter: 165.1 cm (65\").    Weight as of this encounter: 57.3 kg (126 lb 4.8 oz).    BMI is within normal parameters. No other follow-up for BMI required.      Physical Exam  Vitals reviewed.   Constitutional:       General: She is not in acute distress.     Appearance: Normal appearance. She is normal weight.   Eyes:      General: No scleral icterus.     Conjunctiva/sclera: Conjunctivae normal.   Pulmonary:      Effort: Pulmonary effort is normal. No respiratory distress.   Genitourinary:     Comments: She has 3 small external nonthrombosed hemorrhoids from noon to 6:00.  They are nontender.  There is no bleeding.  Musculoskeletal:     "  Right lower leg: No edema.      Left lower leg: No edema.   Neurological:      Mental Status: She is alert and oriented to person, place, and time.   Psychiatric:         Behavior: Behavior normal.        Result Review :                Assessment and Plan   Diagnoses and all orders for this visit:    1. External hemorrhoids without complication (Primary)    2. Varicose veins of both lower extremities, unspecified whether complicated             Follow Up   No follow-ups on file.  Patient was given instructions and counseling regarding her condition or for health maintenance advice. Please see specific information pulled into the AVS if appropriate.     She has been using Preparation H pretty exclusively.  She has change of her workouts so that she does not lift weights right now.  She is swimming and says feels pretty good after that, even feels like it is getting better after she swims.  If she does any walking she has irritation and symptoms.  Plan today is to do hydrocortisone cream 2-3 times per day and also include Tucks pads and continue sitz baths.  Hopefully in a few days things will improve.  She should continue swimming as her sole way of exercising.  I would wait about a week at least until getting back into weightlifting and walking and jogging.  When she returns to weightlifting start with upper body and avoid a lot of deep squatting and leg exercises.  We have to consider potentially needing to go back to vascular soon given her irritation with her right groin perineum varicose vein causing symptoms.  She is going to keep me posted on symptoms and if she is not improving or having any worsening.

## 2022-09-20 ENCOUNTER — OFFICE VISIT (OUTPATIENT)
Dept: OBSTETRICS AND GYNECOLOGY | Age: 42
End: 2022-09-20

## 2022-09-20 VITALS
HEIGHT: 65 IN | SYSTOLIC BLOOD PRESSURE: 110 MMHG | DIASTOLIC BLOOD PRESSURE: 70 MMHG | WEIGHT: 124 LBS | BODY MASS INDEX: 20.66 KG/M2

## 2022-09-20 DIAGNOSIS — N89.8 VAGINAL DRYNESS: ICD-10-CM

## 2022-09-20 DIAGNOSIS — K64.4 EXTERNAL HEMORRHOIDS: ICD-10-CM

## 2022-09-20 DIAGNOSIS — N89.8 VAGINAL PRURITUS: Primary | ICD-10-CM

## 2022-09-20 PROCEDURE — 99213 OFFICE O/P EST LOW 20 MIN: CPT | Performed by: NURSE PRACTITIONER

## 2022-09-20 RX ORDER — HYDROCORTISONE 25 MG/G
CREAM TOPICAL
Qty: 30 G | Refills: 1 | Status: SHIPPED | OUTPATIENT
Start: 2022-09-20 | End: 2022-09-30

## 2022-09-20 NOTE — PROGRESS NOTES
"Subjective     Chief Complaint   Patient presents with   • Follow-up     Pt c/o vaginal itching and extreme vaginal dryness since her annual. She states after her rectal exam in April she has had a hemorrhoid and has been trying to treat that and thought she might have thrown off her pH. Monistat OTC helped while she was using it but did not see full improvement when she was done.        Belle Gregorio is a 42 y.o.  whose LMP is Patient's last menstrual period was 2022 (within days).     Pt presents today with chief complaint of vaginal itching and dryness since her annual exam in April  She has used OTC monistat which did seem to help some  She is , no std risk  She has not noticed any discharge but just feels very dry  Itching is external but has on spot that seems to itch the most  She also c/o external hemorrhoid since CECIL in april  She has used OTC treatments but not helping  She notes bleeding with BM's and sometimes has pain  Pt of Dr. Keller     No Additional Complaints Reported    The following portions of the patient's history were reviewed and updated as appropriate:vital signs, allergies, current medications, past medical history, past social history, past surgical history and problem list      Review of Systems   Pertinent items are noted in HPI.     Objective      /70   Ht 165.1 cm (65\")   Wt 56.2 kg (124 lb)   LMP 2022 (Within Days)   Breastfeeding No   BMI 20.63 kg/m²     Physical Exam  Genitourinary:         Comments: 1)Itching most prominent in this area  2)Small hemorrhoid noted        General:   alert and no distress   Heart: Not performed today   Lungs: Not performed today.   Breast: Not performed today   Neck: na   Abdomen: {Not performed today   CVA: Not performed today   Pelvis: External genitalia: normal general appearance  Urinary system: urethral meatus normal  Vaginal: normal mucosa without prolapse or lesions, normal without tenderness, induration or " masses and normal rugae  Cervix: normal appearance  Rectal: small external hemorrhoid noted   Extremities: Not performed today   Neurologic: negative   Psychiatric: Normal affect, judgement, and mood       Lab Review   Labs: No data reviewed     Imaging   No data reviewed    Assessment & Plan     ASSESSMENT  1. Vaginal pruritus    2. Vaginal dryness    3. External hemorrhoids        PLAN  1.   Orders Placed This Encounter   Procedures   • NuSwab VG+ - Swab, Vagina       2. Medications prescribed this encounter:        New Medications Ordered This Visit   Medications   • Hydrocortisone, Perianal, (Anusol-HC) 2.5 % rectal cream     Sig: Apply rectally 2 times daily     Dispense:  30 g     Refill:  1       3. Vaginal swab done. Anusol for hemorrhoids. If no improvement recommend f/u with colorectal.    Follow up: AUGIEN    Manisha Quevedo, APRN  9/20/2022

## 2022-09-22 LAB
A VAGINAE DNA VAG QL NAA+PROBE: NORMAL SCORE
BVAB2 DNA VAG QL NAA+PROBE: NORMAL SCORE
C ALBICANS DNA VAG QL NAA+PROBE: NEGATIVE
C GLABRATA DNA VAG QL NAA+PROBE: NEGATIVE
C TRACH DNA VAG QL NAA+PROBE: NEGATIVE
MEGA1 DNA VAG QL NAA+PROBE: NORMAL SCORE
N GONORRHOEA DNA VAG QL NAA+PROBE: NEGATIVE
T VAGINALIS DNA VAG QL NAA+PROBE: NEGATIVE

## 2023-06-07 ENCOUNTER — PATIENT ROUNDING (BHMG ONLY) (OUTPATIENT)
Dept: FAMILY MEDICINE CLINIC | Facility: CLINIC | Age: 43
End: 2023-06-07

## 2023-06-07 NOTE — PROGRESS NOTES
A My-Chart message has been sent to the patient for PATIENT ROUNDING with [facility]

## 2023-07-26 DIAGNOSIS — Z12.31 SCREENING MAMMOGRAM FOR BREAST CANCER: Primary | ICD-10-CM

## 2023-09-20 ENCOUNTER — OFFICE VISIT (OUTPATIENT)
Dept: OBSTETRICS AND GYNECOLOGY | Age: 43
End: 2023-09-20
Payer: COMMERCIAL

## 2023-09-20 ENCOUNTER — HOSPITAL ENCOUNTER (OUTPATIENT)
Facility: HOSPITAL | Age: 43
Discharge: HOME | End: 2023-09-20
Admitting: OBSTETRICS & GYNECOLOGY
Payer: COMMERCIAL

## 2023-09-20 VITALS
WEIGHT: 123 LBS | SYSTOLIC BLOOD PRESSURE: 124 MMHG | BODY MASS INDEX: 21.79 KG/M2 | DIASTOLIC BLOOD PRESSURE: 80 MMHG | HEIGHT: 63 IN

## 2023-09-20 DIAGNOSIS — Z97.5 IUD (INTRAUTERINE DEVICE) IN PLACE: ICD-10-CM

## 2023-09-20 DIAGNOSIS — Z12.4 SCREENING FOR CERVICAL CANCER: ICD-10-CM

## 2023-09-20 DIAGNOSIS — Z01.419 WELL WOMAN EXAM WITH ROUTINE GYNECOLOGICAL EXAM: Primary | ICD-10-CM

## 2023-09-20 DIAGNOSIS — Z12.31 SCREENING MAMMOGRAM FOR BREAST CANCER: ICD-10-CM

## 2023-09-20 PROCEDURE — 77067 SCR MAMMO BI INCL CAD: CPT

## 2023-09-20 PROCEDURE — 77063 BREAST TOMOSYNTHESIS BI: CPT

## 2023-09-20 NOTE — PROGRESS NOTES
Subjective     Chief Complaint   Patient presents with    Gynecologic Exam     Cc:  annual visit with mammogram today after the appointment  , last pap 4/15/22 neg  , mammogram benign 4/15/22 , contraception mirena  no periods with IUD - July this yr patient had steroid shot was stung by the bee patient receive a steroid shot and since than started to have periods patient wants  to know if this could be relate to steroid          History of Present Illness    Belle Gregorio is a 43 y.o.  who presents for annual exam.    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 [place].  Has Mirena IUD for contraception - did have menses these past two months  No other GYN concerns or complaints     Obstetric History:  OB History          5    Para   4    Term   4            AB   1    Living   4         SAB   1    IAB        Ectopic        Molar        Multiple        Live Births   4               Menstrual History:     Patient's last menstrual period was 2023 (approximate).         Current contraception: IUD  History of abnormal Pap smear: no  Received Gardasil immunization: no  Perform regular self breast exam: yes - .  Family history of uterine or ovarian cancer: no  Family History of colon cancer: no  Family history of breast cancer: yes - Maternal aunt     Mammogram: done today.  Colonoscopy: not indicated.  DEXA: not indicated.    Exercise: very active or moderately active  Calcium/Vitamin D: adequate intake    The following portions of the patient's history were reviewed and updated as appropriate: allergies, current medications, past family history, past medical history, past social history, past surgical history, and problem list.    Review of Systems   Constitutional: Negative.    Respiratory: Negative.     Cardiovascular: Negative.    Gastrointestinal: Negative.    Genitourinary: Negative.    Skin: Negative.    Psychiatric/Behavioral: Negative.           Objective    Physical Exam  Constitutional:       General: She is awake.      Appearance: Normal appearance. She is well-developed.   HENT:      Head: Normocephalic and atraumatic.      Nose: Nose normal.   Neck:      Thyroid: No thyroid mass, thyromegaly or thyroid tenderness.   Cardiovascular:      Rate and Rhythm: Normal rate and regular rhythm.      Pulses: Normal pulses.      Heart sounds: Normal heart sounds.   Pulmonary:      Effort: Pulmonary effort is normal.      Breath sounds: Normal breath sounds.   Chest:   Breasts:     Breasts are symmetrical.      Right: Normal. No swelling, bleeding, inverted nipple, mass, nipple discharge, skin change or tenderness.      Left: Normal. No swelling, bleeding, inverted nipple, mass, nipple discharge, skin change or tenderness.   Abdominal:      General: Abdomen is flat. Bowel sounds are normal.      Palpations: Abdomen is soft.      Tenderness: There is no abdominal tenderness.   Genitourinary:     General: Normal vulva.      Labia:         Right: No rash, tenderness, lesion or injury.         Left: No rash, tenderness, lesion or injury.       Urethra: No prolapse, urethral pain, urethral swelling or urethral lesion.      Vagina: Normal. No signs of injury. No vaginal discharge, erythema, tenderness, bleeding, lesions or prolapsed vaginal walls.      Cervix: Normal. No discharge, friability, lesion, erythema or cervical bleeding.      Uterus: Normal. Not enlarged, not tender and no uterine prolapse.       Adnexa: Right adnexa normal and left adnexa normal.        Right: No mass, tenderness or fullness.          Left: No mass, tenderness or fullness.        Rectum: Normal. No mass.      Comments: IUD string seen on exam   Musculoskeletal:      Cervical back: Normal range of motion and neck supple.   Lymphadenopathy:      Upper Body:      Right upper body: No supraclavicular adenopathy.      Left upper body: No supraclavicular adenopathy.   Skin:     General: Skin is warm and dry.  "  Neurological:      General: No focal deficit present.      Mental Status: She is alert and oriented to person, place, and time.   Psychiatric:         Mood and Affect: Mood normal.         Behavior: Behavior normal. Behavior is cooperative.         Thought Content: Thought content normal.         Judgment: Judgment normal.       /80   Ht 160 cm (63\")   Wt 55.8 kg (123 lb)   LMP 09/19/2023 (Approximate)   BMI 21.79 kg/m²     Assessment & Plan   Diagnoses and all orders for this visit:    1. Well woman exam with routine gynecological exam (Primary)    2. Screening for cervical cancer  -     IGP, Apt HPV,rfx 16 / 18,45    3. IUD (intrauterine device) in place        All questions answered.  Breast self exam technique reviewed and patient encouraged to perform self-exam monthly.  Discussed healthy lifestyle modifications.  Recommended 30 minutes of aerobic exercise five times per week.  Discussed calcium needs to prevent osteoporosis.    -F/u 1 year               "

## 2023-09-25 LAB
CYTOLOGIST CVX/VAG CYTO: NORMAL
CYTOLOGY CVX/VAG DOC CYTO: NORMAL
CYTOLOGY CVX/VAG DOC THIN PREP: NORMAL
DX ICD CODE: NORMAL
HIV 1 & 2 AB SER-IMP: NORMAL
HPV I/H RISK 4 DNA CVX QL PROBE+SIG AMP: NEGATIVE
OTHER STN SPEC: NORMAL
STAT OF ADQ CVX/VAG CYTO-IMP: NORMAL

## 2023-09-29 DIAGNOSIS — Z12.31 ENCOUNTER FOR SCREENING MAMMOGRAM FOR MALIGNANT NEOPLASM OF BREAST: Primary | ICD-10-CM

## 2024-02-28 NOTE — PROGRESS NOTES
"Belle Gregorio is a 43 y.o. female who is seen as a self-referred consult for hemorrhoids.      HPI:  The patient states that in 2022 she had her annual gynecologic exam, and her provider performed a rectal exam.  Since then, the patient had intermittent discomfort from what she believes was a hemorrhoid.  Over time, the discomfort has subsided, but rectal bleeding has increased.  She states that the most in terms of quantity of blood that she has noticed at 1 time has been about a teaspoon, and the blood is bright red.  She describes some associated discomfort as a \"ripping\" sensation.  She has a skin tag in that area that is irritating and makes anal hygiene difficult.  She denies straining with bowel movements and has regular and formed but soft stools.  She does not take any fiber supplements or stool softeners and eats a healthy diet.  She uses Tucks wipes and over-the-counter topical hydrocortisone if the area is bothering her.  States that the discomfort sometimes radiates to the vagina.    Positive family history of colon polyps in mother. No family history of colorectal cancer. No prior colonoscopy.    Past Medical History:   Diagnosis Date    Migraine     Rectal bleeding 2023    SAB (spontaneous )     Vaginal delivery        Past Surgical History:   Procedure Laterality Date    WISDOM TOOTH EXTRACTION         Social History:   reports that she has never smoked. She has never used smokeless tobacco. She reports that she does not currently use alcohol after a past usage of about 1.0 standard drink of alcohol per week. She reports that she does not use drugs.      Marriage status:     Family History   Problem Relation Age of Onset    Colon polyps Mother     Hypertension Father     No Known Problems Brother     No Known Problems Brother     No Known Problems Brother     Breast cancer Maternal Aunt         dx early 50s    Heart attack Paternal Grandfather 75    Colon cancer Neg Hx     " Uterine cancer Neg Hx     Ovarian cancer Neg Hx          Current Outpatient Medications:     B Complex Vitamins (vitamin b complex) capsule capsule, Take  by mouth Daily., Disp: , Rfl:     hydrocortisone 1 % cream, Apply 1 Application topically to the appropriate area as directed Daily As Needed., Disp: , Rfl:     levonorgestrel (MIRENA) 20 MCG/24HR IUD, 1 each by Intrauterine route 1 (One) Time., Disp: , Rfl:     Magnesium 100 MG capsule, Take  by mouth., Disp: , Rfl:     multivitamin with minerals tablet tablet, Take 1 tablet by mouth Daily., Disp: , Rfl:     Omega-3 Fatty Acids (fish oil) 1000 MG capsule capsule, Take  by mouth Daily With Breakfast., Disp: , Rfl:     Allergy  Patient has no known allergies.    Vitals:    02/29/24 1139   BP: 112/74   Pulse: 86   SpO2: 98%   -  Body mass index is 22.28 kg/m².    Physical Exam  No acute distress  Chaperone present  Perianal exam: 1 cm x 0.75 cm posterior midline anal skin tag.  There is a draining blood clot in the posterior midline.  External hemorrhoids are not enlarged.  CECIL: No masses. Anoscopy performed: Normal internal hemorrhoids    Review of Medical Record:  I reviewed records as noted in HPI.    Assessment:  1. Rectal bleeding    2. Anal skin tag    3. Family history of colonic polyps        Plan:  Schedule colonoscopy with possible excision of anal skin tag, due to family history of colon polyps and rectal bleeding.  I discussed with the patient the physical exam findings of anal skin tag.  I discussed that this can likely be excised at the time of colonoscopy, though it does carry a risk of scar tissue formation that would lead an anal tag in the same location.  I also discussed with her the risks of postoperative discomfort and pain, bleeding, and risk of infection.  She verbalizes understanding and agrees to proceed.  For the rectal bleeding and ripping sensation discomfort, I have prescribed a diltiazem/lidocaine compound cream for possible anal  fissure.  I offered a 6-week follow-up appointment; the patient would like to hold off on scheduling this, as she has her colonoscopy appointment.  Call with any questions or concerns between now and the colonoscopy.        Chelsy Payne PA-C  Physician Assistant  Colorectal Surgery

## 2024-02-29 ENCOUNTER — OFFICE VISIT (OUTPATIENT)
Dept: SURGERY | Facility: CLINIC | Age: 44
End: 2024-02-29
Payer: COMMERCIAL

## 2024-02-29 VITALS
SYSTOLIC BLOOD PRESSURE: 112 MMHG | HEIGHT: 63 IN | OXYGEN SATURATION: 98 % | DIASTOLIC BLOOD PRESSURE: 74 MMHG | BODY MASS INDEX: 22.29 KG/M2 | WEIGHT: 125.8 LBS | HEART RATE: 86 BPM

## 2024-02-29 DIAGNOSIS — K64.4 ANAL SKIN TAG: ICD-10-CM

## 2024-02-29 DIAGNOSIS — K62.5 RECTAL BLEEDING: Primary | ICD-10-CM

## 2024-02-29 DIAGNOSIS — Z83.719 FAMILY HISTORY OF COLONIC POLYPS: ICD-10-CM

## 2024-02-29 PROCEDURE — 46600 DIAGNOSTIC ANOSCOPY SPX: CPT | Performed by: PHYSICIAN ASSISTANT

## 2024-02-29 PROCEDURE — 99204 OFFICE O/P NEW MOD 45 MIN: CPT | Performed by: PHYSICIAN ASSISTANT

## 2024-02-29 RX ORDER — SODIUM CHLORIDE, SODIUM LACTATE, POTASSIUM CHLORIDE, CALCIUM CHLORIDE 600; 310; 30; 20 MG/100ML; MG/100ML; MG/100ML; MG/100ML
30 INJECTION, SOLUTION INTRAVENOUS CONTINUOUS
OUTPATIENT
Start: 2024-02-29

## 2024-02-29 RX ORDER — BENZOCAINE/MENTHOL 6 MG-10 MG
1 LOZENGE MUCOUS MEMBRANE DAILY PRN
COMMUNITY

## 2024-02-29 RX ORDER — VITAMIN B COMPLEX
CAPSULE ORAL DAILY
COMMUNITY

## 2024-07-17 ENCOUNTER — ANESTHESIA (OUTPATIENT)
Dept: GASTROENTEROLOGY | Facility: HOSPITAL | Age: 44
End: 2024-07-17
Payer: COMMERCIAL

## 2024-07-17 ENCOUNTER — ANESTHESIA EVENT (OUTPATIENT)
Dept: GASTROENTEROLOGY | Facility: HOSPITAL | Age: 44
End: 2024-07-17
Payer: COMMERCIAL

## 2024-07-17 ENCOUNTER — HOSPITAL ENCOUNTER (OUTPATIENT)
Facility: HOSPITAL | Age: 44
Setting detail: HOSPITAL OUTPATIENT SURGERY
Discharge: HOME | End: 2024-07-17
Attending: COLON & RECTAL SURGERY | Admitting: COLON & RECTAL SURGERY
Payer: COMMERCIAL

## 2024-07-17 VITALS
HEART RATE: 64 BPM | WEIGHT: 121.2 LBS | SYSTOLIC BLOOD PRESSURE: 111 MMHG | DIASTOLIC BLOOD PRESSURE: 74 MMHG | RESPIRATION RATE: 16 BRPM | BODY MASS INDEX: 21.48 KG/M2 | HEIGHT: 63 IN | OXYGEN SATURATION: 97 %

## 2024-07-17 DIAGNOSIS — K64.4 ANAL SKIN TAG: ICD-10-CM

## 2024-07-17 DIAGNOSIS — K62.5 RECTAL BLEEDING: ICD-10-CM

## 2024-07-17 DIAGNOSIS — Z83.719 FAMILY HISTORY OF COLONIC POLYPS: ICD-10-CM

## 2024-07-17 LAB
B-HCG UR QL: NEGATIVE
EXPIRATION DATE: NORMAL
INTERNAL NEGATIVE CONTROL: NORMAL
INTERNAL POSITIVE CONTROL: NORMAL
Lab: NORMAL

## 2024-07-17 PROCEDURE — 25010000002 PROPOFOL 200 MG/20ML EMULSION: Performed by: NURSE ANESTHETIST, CERTIFIED REGISTERED

## 2024-07-17 PROCEDURE — 45378 DIAGNOSTIC COLONOSCOPY: CPT | Performed by: COLON & RECTAL SURGERY

## 2024-07-17 PROCEDURE — 25810000003 LACTATED RINGERS PER 1000 ML: Performed by: COLON & RECTAL SURGERY

## 2024-07-17 PROCEDURE — 25010000002 KETOROLAC TROMETHAMINE PER 15 MG: Performed by: NURSE ANESTHETIST, CERTIFIED REGISTERED

## 2024-07-17 PROCEDURE — 81025 URINE PREGNANCY TEST: CPT | Performed by: COLON & RECTAL SURGERY

## 2024-07-17 PROCEDURE — 88304 TISSUE EXAM BY PATHOLOGIST: CPT | Performed by: COLON & RECTAL SURGERY

## 2024-07-17 RX ORDER — KETOROLAC TROMETHAMINE 30 MG/ML
INJECTION, SOLUTION INTRAMUSCULAR; INTRAVENOUS AS NEEDED
Status: DISCONTINUED | OUTPATIENT
Start: 2024-07-17 | End: 2024-07-17 | Stop reason: SURG

## 2024-07-17 RX ORDER — TRAMADOL HYDROCHLORIDE 50 MG/1
TABLET ORAL
Qty: 12 TABLET | Refills: 0 | Status: SHIPPED | OUTPATIENT
Start: 2024-07-17

## 2024-07-17 RX ORDER — SODIUM CHLORIDE, SODIUM LACTATE, POTASSIUM CHLORIDE, CALCIUM CHLORIDE 600; 310; 30; 20 MG/100ML; MG/100ML; MG/100ML; MG/100ML
30 INJECTION, SOLUTION INTRAVENOUS CONTINUOUS PRN
Status: DISCONTINUED | OUTPATIENT
Start: 2024-07-17 | End: 2024-07-17 | Stop reason: HOSPADM

## 2024-07-17 RX ORDER — LIDOCAINE 50 MG/G
1 OINTMENT TOPICAL EVERY 4 HOURS PRN
Qty: 30 G | Refills: 4 | Status: SHIPPED | OUTPATIENT
Start: 2024-07-17 | End: 2024-08-16

## 2024-07-17 RX ORDER — PROPOFOL 10 MG/ML
INJECTION, EMULSION INTRAVENOUS CONTINUOUS PRN
Status: DISCONTINUED | OUTPATIENT
Start: 2024-07-17 | End: 2024-07-17 | Stop reason: SURG

## 2024-07-17 RX ORDER — LIDOCAINE HYDROCHLORIDE 20 MG/ML
INJECTION, SOLUTION INFILTRATION; PERINEURAL AS NEEDED
Status: DISCONTINUED | OUTPATIENT
Start: 2024-07-17 | End: 2024-07-17 | Stop reason: SURG

## 2024-07-17 RX ORDER — SODIUM CHLORIDE, SODIUM LACTATE, POTASSIUM CHLORIDE, CALCIUM CHLORIDE 600; 310; 30; 20 MG/100ML; MG/100ML; MG/100ML; MG/100ML
30 INJECTION, SOLUTION INTRAVENOUS CONTINUOUS
Status: DISCONTINUED | OUTPATIENT
Start: 2024-07-17 | End: 2024-07-17

## 2024-07-17 RX ADMIN — KETOROLAC TROMETHAMINE 30 MG: 30 INJECTION, SOLUTION INTRAMUSCULAR at 11:46

## 2024-07-17 RX ADMIN — SODIUM CHLORIDE, POTASSIUM CHLORIDE, SODIUM LACTATE AND CALCIUM CHLORIDE 30 ML/HR: 600; 310; 30; 20 INJECTION, SOLUTION INTRAVENOUS at 10:47

## 2024-07-17 RX ADMIN — PROPOFOL 140 MCG/KG/MIN: 10 INJECTION, EMULSION INTRAVENOUS at 11:26

## 2024-07-17 RX ADMIN — LIDOCAINE HYDROCHLORIDE 60 MG: 20 INJECTION, SOLUTION INFILTRATION; PERINEURAL at 11:26

## 2024-07-17 NOTE — H&P
Belle Gregorio is a 44 y.o. female  who is referred by Kerry Escobar MD for a colonoscopy. She   has an indications: family history of colon polyps.     She denies any change in bowel function, melena, or hematochezia.    Past Medical History:   Diagnosis Date    Migraine     Rectal bleeding 2023    SAB (spontaneous )     Vaginal delivery        Past Surgical History:   Procedure Laterality Date    CYST REMOVAL      WISDOM TOOTH EXTRACTION         No medications prior to admission.       No Known Allergies    Family History   Problem Relation Age of Onset    Colon polyps Mother     Hypertension Father     No Known Problems Brother     No Known Problems Brother     No Known Problems Brother     Breast cancer Maternal Aunt         dx early 50s    Heart attack Paternal Grandfather 75    Colon cancer Neg Hx     Uterine cancer Neg Hx     Ovarian cancer Neg Hx     Malig Hyperthermia Neg Hx        Social History     Socioeconomic History    Marital status:     Number of children: 4   Tobacco Use    Smoking status: Never    Smokeless tobacco: Never   Vaping Use    Vaping status: Never Used   Substance and Sexual Activity    Alcohol use: Not Currently     Alcohol/week: 1.0 standard drink of alcohol    Drug use: Never    Sexual activity: Yes     Partners: Male     Birth control/protection: I.U.D., Vasectomy     Comment: Mirena 3/17/2020 for heavy bleeding       ROS    Vitals:    24 1222   BP: 111/74   Pulse: 64   Resp: 16   SpO2: 97%         Physical Exam  Constitutional:       General: She is not in acute distress.     Appearance: She is well-developed.   HENT:      Head: Normocephalic and atraumatic.   Abdominal:      General: There is no distension.      Palpations: Abdomen is soft.   Musculoskeletal:         General: Normal range of motion.   Neurological:      Mental Status: She is alert.   Psychiatric:         Thought Content: Thought content normal.           Assessment & Plan       indications: family history of colon polyps and anal tag         I recommend colonoscopy and anal tag excision.  I described risks, benefits of the procedure with the patient including but not limited to bleeding, infection, possibility of perforation and possible polypectomy. All of the patient's questions were answered and they would like to proceed with the above recommendations.

## 2024-07-17 NOTE — ANESTHESIA PREPROCEDURE EVALUATION
Anesthesia Evaluation     Patient summary reviewed and Nursing notes reviewed                Airway   Mallampati: II  Dental      Pulmonary - negative pulmonary ROS   Cardiovascular - negative cardio ROS    ECG reviewed  Rhythm: regular  Rate: normal        Neuro/Psych  (+) headaches  GI/Hepatic/Renal/Endo    (+) GI bleeding     Musculoskeletal (-) negative ROS    Abdominal    Substance History - negative use     OB/GYN negative ob/gyn ROS         Other                    Anesthesia Plan    ASA 1     MAC     intravenous induction     Anesthetic plan, risks, benefits, and alternatives have been provided, discussed and informed consent has been obtained with: patient and spouse/significant other.    CODE STATUS:

## 2024-07-17 NOTE — ANESTHESIA POSTPROCEDURE EVALUATION
Patient: Belle Gregorio    Procedure Summary       Date: 07/17/24 Room / Location: [facility] ENDOSCOPY 6 /  [facility]    Anesthesia Start: 1122 Anesthesia Stop: 1152    Procedure: COLONOSCOPY TO CECUM AND  excision of anal skin tag Diagnosis:       Rectal bleeding      Anal skin tag      Family history of colonic polyps      (Rectal bleeding [K62.5])      (Anal skin tag [K64.4])      (Family history of colonic polyps [Z83.719])    Surgeons: Kerry Escobar MD Provider: Vickey Ronquillo MD    Anesthesia Type: MAC ASA Status: 1            Anesthesia Type: MAC    Vitals  Vitals Value Taken Time   BP 93/50 07/17/24 1151   Temp     Pulse 80 07/17/24 1151   Resp 16 07/17/24 1151   SpO2 94 % 07/17/24 1151           Post Anesthesia Care and Evaluation    Patient location during evaluation: PACU  Patient participation: complete - patient participated  Level of consciousness: awake and alert  Pain management: adequate    Airway patency: patent  Anesthetic complications: No anesthetic complications    Cardiovascular status: acceptable  Respiratory status: acceptable  Hydration status: acceptable    Comments: --------------------            07/17/24               1151     --------------------   BP:       93/50      Pulse:      80       Resp:       16       SpO2:      94%      --------------------

## 2024-07-17 NOTE — DISCHARGE INSTRUCTIONS
For the next 24 hours patient needs to be with a responsible adult.    For 24 hours DO NOT drive, operate machinery, appliances, drink alcohol, make important decisions or sign legal documents.    Start with a light or bland diet if you are feeling sick to your stomach otherwise advance to regular diet as tolerated.    Follow recommendations on procedure report if provided by your doctor.    Call Dr WAY for problems [phone]    Problems may include but not limited to: large amounts of bleeding, trouble breathing, repeated vomiting, severe unrelieved pain, fever or chills.

## 2024-07-18 LAB
LAB AP CASE REPORT: NORMAL
LAB AP DIAGNOSIS COMMENT: NORMAL
PATH REPORT.FINAL DX SPEC: NORMAL
PATH REPORT.GROSS SPEC: NORMAL

## 2024-10-08 ENCOUNTER — OFFICE VISIT (OUTPATIENT)
Dept: OBSTETRICS AND GYNECOLOGY | Age: 44
End: 2024-10-08
Payer: COMMERCIAL

## 2024-10-08 ENCOUNTER — HOSPITAL ENCOUNTER (OUTPATIENT)
Facility: HOSPITAL | Age: 44
Discharge: HOME | End: 2024-10-08
Admitting: OBSTETRICS & GYNECOLOGY
Payer: COMMERCIAL

## 2024-10-08 VITALS
DIASTOLIC BLOOD PRESSURE: 80 MMHG | HEIGHT: 63 IN | SYSTOLIC BLOOD PRESSURE: 120 MMHG | BODY MASS INDEX: 21.62 KG/M2 | WEIGHT: 122 LBS

## 2024-10-08 DIAGNOSIS — Z12.31 SCREENING MAMMOGRAM FOR BREAST CANCER: ICD-10-CM

## 2024-10-08 DIAGNOSIS — Z11.51 SCREENING FOR HUMAN PAPILLOMAVIRUS (HPV): ICD-10-CM

## 2024-10-08 DIAGNOSIS — Z12.4 SCREENING FOR MALIGNANT NEOPLASM OF CERVIX: ICD-10-CM

## 2024-10-08 DIAGNOSIS — Z12.31 ENCOUNTER FOR SCREENING MAMMOGRAM FOR MALIGNANT NEOPLASM OF BREAST: ICD-10-CM

## 2024-10-08 DIAGNOSIS — Z01.419 WELL FEMALE EXAM WITH ROUTINE GYNECOLOGICAL EXAM: Primary | ICD-10-CM

## 2024-10-08 PROCEDURE — 77067 SCR MAMMO BI INCL CAD: CPT

## 2024-10-08 PROCEDURE — 77063 BREAST TOMOSYNTHESIS BI: CPT

## 2024-10-08 NOTE — PROGRESS NOTES
"Subjective     Chief Complaint   Patient presents with    Gynecologic Exam     AC & MG        History of Present Illness    Belle Gregorio is a 44 y.o.  who presents for annual exam.  Patient is exercising regularly.  She did have a colonoscopy done this year.  Her kids are doing well.  Her youngest is a son who is 10.  Her menses are rare, lasting 4-7 days, dysmenorrhea none   Obstetric History:  OB History          5    Para   4    Term   4            AB   1    Living   4         SAB   1    IAB        Ectopic        Molar        Multiple        Live Births   4               Menstrual History:     No LMP recorded. Patient has had an implant.         Current contraception: IUD  mirena 3/2020   History of abnormal Pap smear: yes - [facility] 2 yrs ago biopsy neg and follow up  pap was normal  Received Gardasil immunization: no  Perform regular self breast exam : yes  Family history of uterine or ovarian cancer: no  Family History of colon cancer: no  Family history of breast cancer: yes - MA 55     Mammogram: done today.  Colonoscopy:  normal  skin tag   DEXA: not indicated.    Exercise: walks 3, weights and peleton   Calcium/Vitamin D: adequate intake    The following portions of the patient's history were reviewed and updated as appropriate: allergies, current medications, past family history, past medical history, past social history, past surgical history, and problem list.    Review of Systems    Review of Systems   Constitutional: Negative for fatigue.   Respiratory: Negative for shortness of breath.    Gastrointestinal: Negative for abdominal pain.   Genitourinary: Negative for dysuria.   Neurological: Negative for headaches.   Psychiatric/Behavioral: Negative for dysphoric mood.     Objective   Physical Exam    /80   Ht 160 cm (63\")   Wt 55.3 kg (122 lb)   BMI 21.61 kg/m²     General:   alert, appears stated age and cooperative   Neck: thyroid normal to palpation   Heart: " regular rate and rhythm   Lungs: clear to auscultation bilaterally   Abdomen: soft, non-tender, without masses or organomegaly   Breast: inspection negative, no nipple discharge or bleeding, no masses or nodularity palpable   Vulva: normal, Bartholin's, Urethra, Hunt's normal   Vagina: normal mucosa, normal discharge   Cervix: no cervical motion tenderness and no lesions   Uterus: non-tender, normal shape and consistency   Adnexa: no mass, fullness, tenderness   Rectal: not indicated     Assessment & Plan   Diagnoses and all orders for this visit:    1. Well female exam with routine gynecological exam (Primary)  -     IGP, Apt HPV,rfx 16 / 18,45    2. Screening for human papillomavirus (HPV)  -     IGP, Apt HPV,rfx 16 / 18,45    3. Screening for malignant neoplasm of cervix  -     IGP, Apt HPV,rfx 16 / 18,45    4. Screening mammogram for breast cancer  -     Mammo Screening Digital Tomosynthesis Bilateral With CAD; Future      Pap smear done today  Mammogram yearly  Patient has good exercise habits.  Continue Mirena which is doing well.    All questions answered.  Breast self exam technique reviewed and patient encouraged to perform self-exam monthly.  Discussed healthy lifestyle modifications.  Recommended 30 minutes of aerobic exercise five times per week.  Discussed calcium needs to prevent osteoporosis.

## 2024-10-14 LAB
CYTOLOGIST CVX/VAG CYTO: NORMAL
CYTOLOGY CVX/VAG DOC CYTO: NORMAL
CYTOLOGY CVX/VAG DOC THIN PREP: NORMAL
DX ICD CODE: NORMAL
HPV I/H RISK 4 DNA CVX QL PROBE+SIG AMP: NEGATIVE
Lab: NORMAL
OTHER STN SPEC: NORMAL
STAT OF ADQ CVX/VAG CYTO-IMP: NORMAL

## 2025-04-09 ENCOUNTER — OFFICE VISIT (OUTPATIENT)
Dept: FAMILY MEDICINE CLINIC | Facility: CLINIC | Age: 45
End: 2025-04-09
Payer: COMMERCIAL

## 2025-04-09 VITALS
OXYGEN SATURATION: 96 % | DIASTOLIC BLOOD PRESSURE: 72 MMHG | WEIGHT: 127.4 LBS | HEART RATE: 64 BPM | TEMPERATURE: 97.1 F | SYSTOLIC BLOOD PRESSURE: 108 MMHG | BODY MASS INDEX: 22.57 KG/M2 | HEIGHT: 63 IN | RESPIRATION RATE: 12 BRPM

## 2025-04-09 DIAGNOSIS — Z00.00 ROUTINE GENERAL MEDICAL EXAMINATION AT A HEALTH CARE FACILITY: Primary | ICD-10-CM

## 2025-04-09 PROCEDURE — 99396 PREV VISIT EST AGE 40-64: CPT | Performed by: NURSE PRACTITIONER

## 2025-04-09 NOTE — PROGRESS NOTES
"Chief Complaint  Annual Exam    Subjective        Belle Gregorio presents to [facility]  History of Present Illness    History of Present Illness  The patient presents for a physical exam.    She reports no current health concerns and is focused on maintaining her health as she ages. She has been engaging in regular exercise and has expressed interest in undergoing laboratory tests to assess her overall health status. She does not experience any respiratory symptoms such as shortness of breath or cough. She also does not report any episodes of dizziness, unusual fatigue, chest pain, heart palpitations, changes in bowel movements, nausea, vomiting, diarrhea, acid reflux, hematochezia, urinary issues, hematuria, abnormal vaginal bleeding or discharge. Her menstrual cycles are regular, and she uses Mirena for contraception. She is due for a Tdap vaccine and prefers to receive it at a later date. She maintains regular dental check-ups and had to cancel her most recent appointment, which she plans to reschedule.    She reports experiencing pruritus and a mild headache, which she attributes to allergies.  JOSE EDUARDO-7 Score: JOSE EDUARDO 7 Total Score: 0  PHQ-9 Total Score: 0    SOCIAL HISTORY  She works with her  in a  business.    IMMUNIZATIONS  She is due for a Tdap vaccine.       Objective   Vital Signs:  /72   Pulse 64   Temp 97.1 °F (36.2 °C) (Infrared)   Resp 12   Ht 160 cm (63\")   Wt 57.8 kg (127 lb 6.4 oz)   SpO2 96%   BMI 22.57 kg/m²   Estimated body mass index is 22.57 kg/m² as calculated from the following:    Height as of this encounter: 160 cm (63\").    Weight as of this encounter: 57.8 kg (127 lb 6.4 oz).       BMI is within normal parameters. No other follow-up for BMI required.      Physical Exam  Vitals and nursing note reviewed.   Constitutional:       General: She is not in acute distress.     Appearance: She is well-developed. She is not " ill-appearing.   HENT:      Head: Normocephalic and atraumatic.      Right Ear: Tympanic membrane, ear canal and external ear normal.      Left Ear: Ear canal and external ear normal.      Ears:      Comments: Left TM dull     Mouth/Throat:      Mouth: Mucous membranes are moist.      Pharynx: Uvula midline. No posterior oropharyngeal erythema.      Comments: Dentition normal  Eyes:      General:         Right eye: No discharge.         Left eye: No discharge.      Conjunctiva/sclera: Conjunctivae normal.      Pupils: Pupils are equal, round, and reactive to light.   Neck:      Thyroid: No thyromegaly.   Cardiovascular:      Rate and Rhythm: Normal rate and regular rhythm.      Heart sounds: Normal heart sounds.   Pulmonary:      Effort: Pulmonary effort is normal.      Breath sounds: Normal breath sounds.   Abdominal:      General: Bowel sounds are normal. There is no distension.      Palpations: Abdomen is soft. There is no mass.      Tenderness: There is no abdominal tenderness. There is no guarding or rebound.      Hernia: No hernia is present.   Musculoskeletal:         General: No deformity.      Cervical back: Neck supple.      Comments: Gait smooth and steady   Lymphadenopathy:      Cervical: Cervical adenopathy (Left) present.   Skin:     General: Skin is warm and dry.   Neurological:      General: No focal deficit present.      Mental Status: She is alert and oriented to person, place, and time.   Psychiatric:         Mood and Affect: Mood normal.         Behavior: Behavior normal.         Thought Content: Thought content normal.          Physical Exam       Result Review :            Results                  Assessment and Plan     Diagnoses and all orders for this visit:    1. Routine general medical examination at a health care facility (Primary)  -     CBC (No Diff)  -     Comprehensive Metabolic Panel  -     Lipid Panel With LDL / HDL Ratio  -     TSH Rfx On Abnormal To Free T4  -     Hemoglobin  A1c        Assessment & Plan  1. Health maintenance.  She is due for a Tdap vaccine and has been advised to receive it at the pharmacy at her convenience. A comprehensive set of laboratory tests will be conducted today, including CBC, liver function tests, kidney function tests, electrolyte panel, cholesterol panel, thyroid function tests, glucose level, and hemoglobin A1c.    2. Allergies.  She reports experiencing pruritus and a mild headache, which she attributes to allergies.    Appropriate health maintenance and prevention topics specific for this patient were discussed today.  Additionally, health goals, and health concerns addressed as appropriate.  Pt was encouraged to stay up to date on recommended screenings and vaccines based on USPSTF guidelines. UTD on HM.  Mammo per GYN-due this fall              Follow Up     No follow-ups on file.  Patient was given instructions and counseling regarding her condition or for health maintenance advice. Please see specific information pulled into the AVS if appropriate.    Patient or patient representative verbalized consent for the use of Ambient Listening during the visit with  LUIS ENRIQUE Gutiérrez for chart documentation. 4/25/2025  12:01 EDT

## 2025-04-10 LAB
ALBUMIN SERPL-MCNC: 4.5 G/DL (ref 3.5–5.2)
ALBUMIN/GLOB SERPL: 2 G/DL
ALP SERPL-CCNC: 55 U/L (ref 39–117)
ALT SERPL-CCNC: 11 U/L (ref 1–33)
AST SERPL-CCNC: 23 U/L (ref 1–32)
BILIRUB SERPL-MCNC: 0.5 MG/DL (ref 0–1.2)
BUN SERPL-MCNC: 14 MG/DL (ref 6–20)
BUN/CREAT SERPL: 18.7 (ref 7–25)
CALCIUM SERPL-MCNC: 9.5 MG/DL (ref 8.6–10.5)
CHLORIDE SERPL-SCNC: 106 MMOL/L (ref 98–107)
CHOLEST SERPL-MCNC: 157 MG/DL (ref 0–200)
CO2 SERPL-SCNC: 24.7 MMOL/L (ref 22–29)
CREAT SERPL-MCNC: 0.75 MG/DL (ref 0.57–1)
EGFRCR SERPLBLD CKD-EPI 2021: 101.5 ML/MIN/1.73
ERYTHROCYTE [DISTWIDTH] IN BLOOD BY AUTOMATED COUNT: 12.3 % (ref 12.3–15.4)
GLOBULIN SER CALC-MCNC: 2.2 GM/DL
GLUCOSE SERPL-MCNC: 76 MG/DL (ref 65–99)
HBA1C MFR BLD: 5 % (ref 4.8–5.6)
HCT VFR BLD AUTO: 43.5 % (ref 34–46.6)
HDLC SERPL-MCNC: 67 MG/DL (ref 40–60)
HGB BLD-MCNC: 13.9 G/DL (ref 12–15.9)
LDLC SERPL CALC-MCNC: 80 MG/DL (ref 0–100)
LDLC/HDLC SERPL: 1.2 {RATIO}
MCH RBC QN AUTO: 29.8 PG (ref 26.6–33)
MCHC RBC AUTO-ENTMCNC: 32 G/DL (ref 31.5–35.7)
MCV RBC AUTO: 93.3 FL (ref 79–97)
PLATELET # BLD AUTO: 223 10*3/MM3 (ref 140–450)
POTASSIUM SERPL-SCNC: 4.5 MMOL/L (ref 3.5–5.2)
PROT SERPL-MCNC: 6.7 G/DL (ref 6–8.5)
RBC # BLD AUTO: 4.66 10*6/MM3 (ref 3.77–5.28)
SODIUM SERPL-SCNC: 141 MMOL/L (ref 136–145)
TRIGL SERPL-MCNC: 47 MG/DL (ref 0–150)
TSH SERPL DL<=0.005 MIU/L-ACNC: 1.23 UIU/ML (ref 0.27–4.2)
VLDLC SERPL CALC-MCNC: 10 MG/DL (ref 5–40)
WBC # BLD AUTO: 3.18 10*3/MM3 (ref 3.4–10.8)

## (undated) DEVICE — LN SMPL CO2 SHTRM SD STREAM W/M LUER

## (undated) DEVICE — ADAPT CLN BIOGUARD AIR/H2O DISP

## (undated) DEVICE — CANN O2 ETCO2 FITS ALL CONN CO2 SMPL A/ 7IN DISP LF

## (undated) DEVICE — KT ORCA ORCAPOD DISP STRL

## (undated) DEVICE — SENSR O2 OXIMAX FNGR A/ 18IN NONSTR

## (undated) DEVICE — TUBING, SUCTION, 1/4" X 10', STRAIGHT: Brand: MEDLINE